# Patient Record
Sex: MALE | Race: WHITE | NOT HISPANIC OR LATINO | URBAN - METROPOLITAN AREA
[De-identification: names, ages, dates, MRNs, and addresses within clinical notes are randomized per-mention and may not be internally consistent; named-entity substitution may affect disease eponyms.]

---

## 2018-01-01 ENCOUNTER — EMERGENCY (EMERGENCY)
Facility: HOSPITAL | Age: 28
LOS: 1 days | Discharge: HOME | End: 2018-01-01
Attending: EMERGENCY MEDICINE | Admitting: EMERGENCY MEDICINE
Payer: COMMERCIAL

## 2018-01-01 VITALS
HEART RATE: 69 BPM | RESPIRATION RATE: 16 BRPM | HEIGHT: 67 IN | DIASTOLIC BLOOD PRESSURE: 71 MMHG | TEMPERATURE: 99 F | WEIGHT: 184.97 LBS | SYSTOLIC BLOOD PRESSURE: 125 MMHG

## 2018-01-01 PROCEDURE — 99284 EMERGENCY DEPT VISIT MOD MDM: CPT

## 2018-01-01 PROCEDURE — 70450 CT HEAD/BRAIN W/O DYE: CPT | Mod: 26

## 2018-01-01 PROCEDURE — 70450 CT HEAD/BRAIN W/O DYE: CPT

## 2018-01-01 PROCEDURE — 99285 EMERGENCY DEPT VISIT HI MDM: CPT | Mod: 25

## 2018-01-01 NOTE — ED ADULT NURSE NOTE - ED STAT RN HANDOFF DETAILS
Pending CT results. Patient endorsed to Jasiel Umanzor RN. Concussion Program info card provided to patient.

## 2018-01-01 NOTE — ED PROVIDER NOTE - MEDICAL DECISION MAKING DETAILS
28 yo male with head injury while snowboarding. Has headache and nausea for 4 days. PE non focal. Likely mild concussion. Plan - CT brain. Concussion clinic follow up this week.

## 2018-01-01 NOTE — ED ADULT NURSE NOTE - CHPI ED SYMPTOMS NEG
no loss of consciousness/no dizziness/no fever/no weakness/no change in level of consciousness/no numbness/no confusion/no blurred vision

## 2018-01-01 NOTE — ED PROVIDER NOTE - OBJECTIVE STATEMENT
Was snowboarding 4 days ago in [place] when he fell hitting helmeted head on ice. Has had headache and nausea since. Denies LOC, fever, neck pain, or other injury.

## 2018-01-01 NOTE — ED ADULT NURSE NOTE - OBJECTIVE STATEMENT
Patient fell snowboarding in [place] 2 days ago and struck the back of his head. Denies LOC. Patient was wearing a helmet but states it lifted a little when he fell. Patient c/o occipital pain and had nausea and vomited yesterday. with decreased appetite. Patient feels a little off balnce at time.

## 2018-01-01 NOTE — ED ADULT NURSE NOTE - MUSCULOSKELETAL ASSESSMENT
Problem: Patient Care Overview  Goal: Plan of Care/Patient Progress Review  Outcome: No Change  Vitals:     10/12/17 0440 10/12/17 0744 10/12/17 1100 10/12/17 1114   BP: 118/66 129/73 116/73 122/56   BP Location: Left arm Right arm Right arm Left arm   Pulse:       86   Resp: 16 26 26   Temp: 99.2  F (37.3  C) 97.3  F (36.3  C)   98.6  F (37  C)   TempSrc: Oral         SpO2: 96% 97% 94%     Weight:           Height:           AVSS. Using IS to 250-500ml with encouragement. Alert and oriented x 4. Using call light appropriately. Calling for bed pan. Voided x2. No BM this shift. Less c/o of blurred vision today. No c/o nausea. Still c/o intermitent chest wall pain and using Lido patch. OOB with lots of encouragements. Was able to sit x 40min. Needs assist of 2 and walker. Poor appetite. Declined any food. Had apple juice and a popsickle. Continue to monitor status and update team with any changes.       WDL

## 2018-01-01 NOTE — ED PROVIDER NOTE - CHPI ED SYMPTOMS NEG
no weakness/no seizure/no confusion/no change in level of consciousness/no blurred vision/no loss of consciousness/no syncope

## 2018-01-01 NOTE — ED PROVIDER NOTE - ENMT, MLM
Airway patent, Nasal mucosa clear. Mouth with normal mucosa. Throat has no vesicles, no oropharyngeal exudates and uvula is midline. Atraumatic scalp.

## 2022-08-01 NOTE — ED PROVIDER NOTE - FAMILY HISTORY
[id number]   • Testicular cancer (CMS/[facility])         intracranial hemorrhage. Diffusion weighted imaging demonstrates no areas of abnormal signal.     The imaged orbits and extraocular muscles and mastoid air spaces are normal. Redemonstration of retention cysts in the sinuses. There is a rounded T2 hyperintense structure in the left ethmoid, likely an high proteinaceous cyst, stable.    Other Result Text    Epic, Interface Incoming Radiology Results - 05/24/2021  2:33 PM CDT   Formatting of this note might be different from the original.   intracranial hemorrhage. Diffusion weighted imaging demonstrates no areas of abnormal signal.     The imaged orbits and extraocular muscles and mastoid air spaces are normal. Redemonstration of retention cysts in the sinuses. There is a rounded T2 hyperintense structure in the left ethmoid, likely an high proteinaceous cyst, stable.       IMPRESSION:   Impression: No acute process seen.     Electronically Signed By: Chava Hill MD on 5/24/2021 2:31 PM CDT  Specimen Collected: --   Date: 05/24/21              Impression & Plan:   It is my impression that Mr. Nguyen has nasal polyps, septal perforation, some evidence of progression today  -Nasal endoscopy discussed as above.  Evidence of disease progression of his nasal polyps we discussed a course of prednisone.  Side effects discussed including avascular necrosis.  He will try this.  Continue dexamethasone nasal spray.  Discussed if does not improve enough may consider additional treatment including sinuva and/or sinus surgery.  He verbalized understanding and agreement the plan.    Follow-up 2 to 3 months to  reassess    Cyrus Martinez MD         No pertinent family history in first degree relatives

## 2024-03-30 ENCOUNTER — EMERGENCY (EMERGENCY)
Facility: HOSPITAL | Age: 34
LOS: 1 days | Discharge: HOME | End: 2024-03-30
Attending: EMERGENCY MEDICINE
Payer: COMMERCIAL

## 2024-03-30 VITALS
SYSTOLIC BLOOD PRESSURE: 145 MMHG | HEIGHT: 68 IN | OXYGEN SATURATION: 99 % | HEART RATE: 78 BPM | RESPIRATION RATE: 16 BRPM | TEMPERATURE: 98 F | WEIGHT: 195.99 LBS | DIASTOLIC BLOOD PRESSURE: 94 MMHG

## 2024-03-30 PROCEDURE — 70490 CT SOFT TISSUE NECK W/O DYE: CPT | Mod: MC

## 2024-03-30 PROCEDURE — 99284 EMERGENCY DEPT VISIT MOD MDM: CPT

## 2024-03-30 PROCEDURE — 99284 EMERGENCY DEPT VISIT MOD MDM: CPT | Mod: 25

## 2024-03-30 PROCEDURE — 70490 CT SOFT TISSUE NECK W/O DYE: CPT | Mod: 26,MC

## 2024-03-30 RX ORDER — LIDOCAINE 4 G/100G
10 CREAM TOPICAL ONCE
Refills: 0 | Status: COMPLETED | OUTPATIENT
Start: 2024-03-30 | End: 2024-03-30

## 2024-03-30 RX ADMIN — Medication 30 MILLILITER(S): at 17:22

## 2024-03-30 RX ADMIN — LIDOCAINE 10 MILLILITER(S): 4 CREAM TOPICAL at 17:23

## 2024-03-30 NOTE — ED PROVIDER NOTE - PATIENT PORTAL LINK FT
You can access the FollowMyHealth Patient Portal offered by [facility] by registering at the following website: [email or web address]. By joining [facility]’s FollowMyHealth portal, you will also be able to view your health information using other applications (apps) compatible with our system.

## 2024-03-30 NOTE — ED PROVIDER NOTE - NSFOLLOWUPINSTRUCTIONS_ED_ALL_ED_FT
Please follow-up with your doctor within 4 days.      Return to the emergency department if worsening of symptoms, trouble breathing, unable to swallow, changes in voice, fever, chills, or any new concerns

## 2024-03-30 NOTE — ED PROVIDER NOTE - ATTENDING APP SHARED VISIT CONTRIBUTION OF CARE
Chun: I performed a face to face bedside interview with patient regarding history of present illness, review of symptoms and past medical history. I completed an independent physical exam.  I have discussed patient's plan of care with advanced care provider.   I agree with note as stated above including HISTORY OF PRESENT ILLNESS, HIV, PAST MEDICAL/SURGICAL/FAMILY/SOCIAL HISTORY, ALLERGIES AND HOME MEDICATIONS, REVIEW OF SYSTEMS, PHYSICAL EXAM, MEDICAL DECISION MAKING and any PROGRESS NOTES during the time I functioned as the attending physician for this patient  unless otherwise noted. My brief assessment is as follows: no pmh present for evaluation for possible foreign body in throat. was eating chicken that had small bones and felt it go into his throat, isn't sure it passed or not but has some irrigation. is able to swallow/tolerating secretions. no chest or neck pain/creptius, no other complaints. non toxic, well appearing, nl pharynx without any secretions, ctab, rrr. will get ct neck to eval fb.

## 2024-08-13 ENCOUNTER — APPOINTMENT (OUTPATIENT)
Dept: OTOLARYNGOLOGY | Facility: CLINIC | Age: 34
End: 2024-08-13